# Patient Record
Sex: MALE | Race: OTHER | HISPANIC OR LATINO
[De-identification: names, ages, dates, MRNs, and addresses within clinical notes are randomized per-mention and may not be internally consistent; named-entity substitution may affect disease eponyms.]

---

## 2018-01-06 ENCOUNTER — APPOINTMENT (OUTPATIENT)
Dept: OBGYN | Facility: CLINIC | Age: 43
End: 2018-01-06
Payer: SELF-PAY

## 2018-01-06 DIAGNOSIS — Z20.828 CONTACT WITH AND (SUSPECTED) EXPOSURE TO OTHER VIRAL COMMUNICABLE DISEASES: ICD-10-CM

## 2018-01-06 PROCEDURE — 36415 COLL VENOUS BLD VENIPUNCTURE: CPT | Mod: NC

## 2018-01-07 PROBLEM — Z20.828 EXPOSURE TO ZIKA VIRUS: Status: ACTIVE | Noted: 2018-01-06

## 2018-01-09 LAB
ZIKA PCR SERUM: NOT DETECTED
ZIKA PCR URINE: NOT DETECTED

## 2020-07-17 ENCOUNTER — APPOINTMENT (OUTPATIENT)
Dept: ORTHOPEDIC SURGERY | Facility: CLINIC | Age: 45
End: 2020-07-17

## 2020-08-05 ENCOUNTER — APPOINTMENT (OUTPATIENT)
Dept: ORTHOPEDIC SURGERY | Facility: CLINIC | Age: 45
End: 2020-08-05
Payer: OTHER MISCELLANEOUS

## 2020-08-05 VITALS — WEIGHT: 185 LBS | HEIGHT: 66 IN | BODY MASS INDEX: 29.73 KG/M2

## 2020-08-05 VITALS — TEMPERATURE: 95.3 F

## 2020-08-05 DIAGNOSIS — S39.012A STRAIN OF MUSCLE, FASCIA AND TENDON OF LOWER BACK, INITIAL ENCOUNTER: ICD-10-CM

## 2020-08-05 DIAGNOSIS — M54.5 LOW BACK PAIN: ICD-10-CM

## 2020-08-05 PROCEDURE — 72100 X-RAY EXAM L-S SPINE 2/3 VWS: CPT

## 2020-08-05 PROCEDURE — 99203 OFFICE O/P NEW LOW 30 MIN: CPT

## 2020-08-05 RX ORDER — CYCLOBENZAPRINE HYDROCHLORIDE 10 MG/1
10 TABLET, FILM COATED ORAL 3 TIMES DAILY
Qty: 60 | Refills: 0 | Status: ACTIVE | COMMUNITY
Start: 2020-08-05 | End: 1900-01-01

## 2020-08-05 NOTE — PHYSICAL EXAM
[de-identified] : Examination of the lumbar spine reveals no midline tenderness palpation, step-offs, or skin lesions. Decreased range of motion with respect to flexion, extension, lateral bending, and rotation. No tenderness to palpation of the sciatic notch. No tenderness palpation of the bilateral greater trochanters. No pain with passive internal/external rotation of the hips. No instability of bilateral lower extremities.  Negative REAL. Negative straight leg raise bilaterally. No bowstring. Negative femoral stretch. 5 out of 5 iliopsoas, hip abductors, hips adductors, quadriceps, hamstrings, gastrocsoleus, tibialis anterior, extensor hallucis longus, peroneals. Grossly intact sensation to light touch bilateral lower extremities. 1+ patellar and Achilles reflexes. Downgoing Babinski. No clonus. Intact proprioception. Palpable pulses. No skin lesion and no edema on the right and left lower extremities. [de-identified] : AP lateral lumbar x-rays do not reveal any fracture or dislocation.  Mild degenerative changes.

## 2020-08-05 NOTE — DISCUSSION/SUMMARY
[de-identified] : He will try a course of physical therapy as well as over-the-counter NSAIDs and a muscle relaxant.  MRI if not improved.
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
## 2020-10-21 ENCOUNTER — APPOINTMENT (OUTPATIENT)
Dept: ORTHOPEDIC SURGERY | Facility: CLINIC | Age: 45
End: 2020-10-21
Payer: OTHER MISCELLANEOUS

## 2020-10-21 VITALS — TEMPERATURE: 95.5 F

## 2020-10-21 DIAGNOSIS — M47.817 SPONDYLOSIS W/OUT MYELOPATHY OR RADICULOPATHY, LUMBOSACRAL REGION: ICD-10-CM

## 2020-10-21 PROCEDURE — 99214 OFFICE O/P EST MOD 30 MIN: CPT

## 2020-10-21 NOTE — DISCUSSION/SUMMARY
[de-identified] : We discussed further treatment options.  He wishes to continue with physical therapy.  Follow-up afterwards.

## 2020-10-21 NOTE — PHYSICAL EXAM
[de-identified] : Examination of the lumbar spine reveals no midline tenderness palpation, step-offs, or skin lesions. Decreased range of motion with respect to flexion, extension, lateral bending, and rotation. No tenderness to palpation of the sciatic notch. No tenderness palpation of the bilateral greater trochanters. No pain with passive internal/external rotation of the hips. No instability of bilateral lower extremities.  Negative REAL. Negative straight leg raise bilaterally. No bowstring. Negative femoral stretch. 5 out of 5 iliopsoas, hip abductors, hips adductors, quadriceps, hamstrings, gastrocsoleus, tibialis anterior, extensor hallucis longus, peroneals. Grossly intact sensation to light touch bilateral lower extremities. 1+ patellar and Achilles reflexes. Downgoing Babinski. No clonus. Intact proprioception. Palpable pulses. No skin lesion and no edema on the right and left lower extremities. [de-identified] : AP lateral lumbar x-rays do not reveal any fracture or dislocation.  Mild degenerative changes.

## 2020-10-21 NOTE — HISTORY OF PRESENT ILLNESS
[de-identified] : Mr. JYOTHI OROZCO  is a 44 year old male who presents to the office for a follow-up visit.  He has been doing physical therapy and is 50% better.

## 2020-10-28 ENCOUNTER — APPOINTMENT (OUTPATIENT)
Dept: ORTHOPEDIC SURGERY | Facility: CLINIC | Age: 45
End: 2020-10-28
Payer: MEDICAID

## 2020-10-28 VITALS
HEART RATE: 69 BPM | HEIGHT: 78 IN | BODY MASS INDEX: 21.4 KG/M2 | TEMPERATURE: 97.9 F | SYSTOLIC BLOOD PRESSURE: 114 MMHG | WEIGHT: 185 LBS | DIASTOLIC BLOOD PRESSURE: 72 MMHG

## 2020-10-28 DIAGNOSIS — Z87.39 PERSONAL HISTORY OF OTHER DISEASES OF THE MUSCULOSKELETAL SYSTEM AND CONNECTIVE TISSUE: ICD-10-CM

## 2020-10-28 DIAGNOSIS — M79.602 PAIN IN LEFT ARM: ICD-10-CM

## 2020-10-28 DIAGNOSIS — M25.532 PAIN IN LEFT WRIST: ICD-10-CM

## 2020-10-28 DIAGNOSIS — M79.603 PAIN IN ARM, UNSPECIFIED: ICD-10-CM

## 2020-10-28 LAB
ALBUMIN SERPL ELPH-MCNC: 4.2 G/DL
ALP BLD-CCNC: 90 U/L
ALT SERPL-CCNC: 23 U/L
ANION GAP SERPL CALC-SCNC: 13 MMOL/L
APTT BLD: 32.8 SEC
AST SERPL-CCNC: 20 U/L
BASOPHILS # BLD AUTO: 0.04 K/UL
BASOPHILS NFR BLD AUTO: 0.6 %
BILIRUB SERPL-MCNC: 0.2 MG/DL
BUN SERPL-MCNC: 16 MG/DL
CALCIUM SERPL-MCNC: 9.5 MG/DL
CHLORIDE SERPL-SCNC: 100 MMOL/L
CO2 SERPL-SCNC: 25 MMOL/L
CREAT SERPL-MCNC: 0.87 MG/DL
EOSINOPHIL # BLD AUTO: 0.3 K/UL
EOSINOPHIL NFR BLD AUTO: 4.2 %
GLUCOSE SERPL-MCNC: 103 MG/DL
HCT VFR BLD CALC: 47.4 %
HGB BLD-MCNC: 15.1 G/DL
IMM GRANULOCYTES NFR BLD AUTO: 0.4 %
INR PPP: 0.92 RATIO
LYMPHOCYTES # BLD AUTO: 2.52 K/UL
LYMPHOCYTES NFR BLD AUTO: 35.2 %
MAN DIFF?: NORMAL
MCHC RBC-ENTMCNC: 27.7 PG
MCHC RBC-ENTMCNC: 31.9 GM/DL
MCV RBC AUTO: 87 FL
MONOCYTES # BLD AUTO: 0.61 K/UL
MONOCYTES NFR BLD AUTO: 8.5 %
NEUTROPHILS # BLD AUTO: 3.66 K/UL
NEUTROPHILS NFR BLD AUTO: 51.1 %
PLATELET # BLD AUTO: 401 K/UL
POTASSIUM SERPL-SCNC: 4.3 MMOL/L
PROT SERPL-MCNC: 7.4 G/DL
PT BLD: 11 SEC
RBC # BLD: 5.45 M/UL
RBC # FLD: 12.7 %
SODIUM SERPL-SCNC: 137 MMOL/L
WBC # FLD AUTO: 7.16 K/UL

## 2020-10-28 PROCEDURE — 99213 OFFICE O/P EST LOW 20 MIN: CPT

## 2020-10-28 PROCEDURE — 73110 X-RAY EXAM OF WRIST: CPT | Mod: LT

## 2020-10-28 PROCEDURE — 99072 ADDL SUPL MATRL&STAF TM PHE: CPT

## 2020-10-28 RX ORDER — IBUPROFEN 800 MG/1
TABLET, FILM COATED ORAL
Refills: 0 | Status: ACTIVE | COMMUNITY

## 2020-10-28 RX ORDER — CEPHALEXIN 500 MG/1
500 CAPSULE ORAL
Refills: 0 | Status: ACTIVE | COMMUNITY

## 2020-10-28 RX ORDER — TESTOSTERONE
POWDER (GRAM) MISCELLANEOUS
Refills: 0 | Status: ACTIVE | COMMUNITY

## 2020-10-28 NOTE — ADDENDUM
[FreeTextEntry1] : I, Adriana Aldana, acted solely as a scribe for Dr. Dodge on this date 10/28/2020.

## 2020-10-28 NOTE — END OF VISIT
[FreeTextEntry3] : This note was written by Adriana Aldana on 10/28/2020 acting solely as a scribe for Dr. Patrick Dodge.\par  \par All medical record entries made by the Scribe were at my, Dr. Patrick Dodge, direction and personally dictated by me on 10/28/2020. I have personally reviewed the chart and agree that the record accurately reflects my personal performance of the history, physical exam, assessment and plan.

## 2020-10-28 NOTE — DISCUSSION/SUMMARY
[FreeTextEntry1] : He has findings consistent with a displaced and comminuted intra-articular left distal radius fracture after a fall 6 days ago.\par \par I had a discussion regarding today's visit, the diagnosis, and treatment recommendations / options.  At this time, I have recommended ORIF.  He has agreed to proceed. I also recommended extra-strength Tylenol every 6 hours for pain relief in the interim. \par \par -  The nature and purposes of the operation/procedure was discussed in detail.  I discussed the surgical procedure in detail, as well as the expected postoperative recovery and outcome.\par -  Possible risks, benefits, and complications (from known and unknown causes) of the procedure were discussed in detail.  \par -  Possible non-operative alternatives to the proposed treatment were discussed in detail.  \par -  He was told that possible risks/complications include, but are not limited to:  Infection, nerve or vessel injury, stiffness, painful scar, poor outcome, need for additional surgical procedures, and other unforeseen complications. I also discussed additional potential risks inherent in ORIF of a distal radius fracture with a volar plate. The patient understands that there is a risk that the plate may need to be removed in the future, if it results in pain or other hardware-related problems. I also discussed the potential of tendon related problems secondary to volar plates, including, but not limited to, tendinitis, tendinopathy, and even tendon rupture at a late date.  \par -  Finally, the possibility of an ""unsuccessful outcome,"" despite ""successful surgery,"" was discussed with the patient.  \par -  The patient fully understands these risks and wishes to proceed.  \par -  I had a lengthy discussion with the patient regarding today's visit, the diagnosis, and my surgical treatment recommendations.  The patient has agreed to this plan of management and has expressed full understanding.  All questions were fully answered to the patient's satisfaction. \par \par Over 50% of the time spent with the patient was on counseling the patient on the above diagnosis, treatment plan and prognosis.
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
## 2020-10-30 ENCOUNTER — OUTPATIENT (OUTPATIENT)
Dept: OUTPATIENT SERVICES | Facility: HOSPITAL | Age: 45
LOS: 1 days | End: 2020-10-30
Payer: MEDICAID

## 2020-10-30 VITALS
RESPIRATION RATE: 9 BRPM | OXYGEN SATURATION: 97 % | HEIGHT: 66 IN | WEIGHT: 193.12 LBS | SYSTOLIC BLOOD PRESSURE: 134 MMHG | TEMPERATURE: 98 F | DIASTOLIC BLOOD PRESSURE: 94 MMHG | HEART RATE: 81 BPM

## 2020-10-30 DIAGNOSIS — Z98.890 OTHER SPECIFIED POSTPROCEDURAL STATES: Chronic | ICD-10-CM

## 2020-10-30 DIAGNOSIS — S52.502A UNSPECIFIED FRACTURE OF THE LOWER END OF LEFT RADIUS, INITIAL ENCOUNTER FOR CLOSED FRACTURE: ICD-10-CM

## 2020-10-30 DIAGNOSIS — Z01.818 ENCOUNTER FOR OTHER PREPROCEDURAL EXAMINATION: ICD-10-CM

## 2020-10-30 DIAGNOSIS — Z90.49 ACQUIRED ABSENCE OF OTHER SPECIFIED PARTS OF DIGESTIVE TRACT: Chronic | ICD-10-CM

## 2020-10-30 NOTE — H&P PST ADULT - ASSESSMENT
44  is scheduled for ORIF of intra articular left distal radius fracture with greater than 3 fragments on 11/3/2020 with Dr Dodge at [facility].

## 2020-10-30 NOTE — H&P PST ADULT - NSICDXPASTSURGICALHX_GEN_ALL_CORE_FT
PAST SURGICAL HISTORY:  History of appendectomy 1993    History of bilateral inguinal hernia repair 2000

## 2020-10-30 NOTE — H&P PST ADULT - NSICDXPROBLEM_GEN_ALL_CORE_FT
PROBLEM DIAGNOSES  Problem: Distal radius fracture, left  Assessment and Plan: ORIF left distal radius fracture is scheduled for 11/3/2020 with physical exam at bedside.  pre op instructions were reviewed; COVID19 PCR testing is schedueld for 11/1/2020; isolation reviewed.
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
## 2020-10-30 NOTE — H&P PST ADULT - MUSCULOSKELETAL
details… detailed exam left hand, arm-fingers warm and mobile with sensation present/no calf tenderness/decreased ROM/decreased ROM due to pain

## 2020-10-30 NOTE — H&P PST ADULT - NSANTHOSAYNRD_GEN_A_CORE
No. EYAL screening performed.  STOP BANG Legend: 0-2 = LOW Risk; 3-4 = INTERMEDIATE Risk; 5-8 = HIGH Risk

## 2020-11-01 ENCOUNTER — OUTPATIENT (OUTPATIENT)
Dept: OUTPATIENT SERVICES | Facility: HOSPITAL | Age: 45
LOS: 1 days | End: 2020-11-01
Payer: MEDICAID

## 2020-11-01 DIAGNOSIS — Z90.49 ACQUIRED ABSENCE OF OTHER SPECIFIED PARTS OF DIGESTIVE TRACT: Chronic | ICD-10-CM

## 2020-11-01 DIAGNOSIS — Z98.890 OTHER SPECIFIED POSTPROCEDURAL STATES: Chronic | ICD-10-CM

## 2020-11-01 DIAGNOSIS — Z11.59 ENCOUNTER FOR SCREENING FOR OTHER VIRAL DISEASES: ICD-10-CM

## 2020-11-01 LAB — SARS-COV-2 RNA SPEC QL NAA+PROBE: SIGNIFICANT CHANGE UP

## 2020-11-01 PROCEDURE — U0003: CPT

## 2020-11-02 ENCOUNTER — TRANSCRIPTION ENCOUNTER (OUTPATIENT)
Age: 45
End: 2020-11-02

## 2020-11-02 DIAGNOSIS — Z11.59 ENCOUNTER FOR SCREENING FOR OTHER VIRAL DISEASES: ICD-10-CM

## 2020-11-02 PROCEDURE — G0463: CPT

## 2020-11-02 NOTE — ASU PATIENT PROFILE, ADULT - MEDICATION HERBAL REMEDIES, PROFILE
Orders Placed This Encounter   • SERVICE TO [facility]     Requested that they bring them next week.     Patient still needs to keep follow-up appointment with Dr. Valenzuela   no

## 2020-11-03 ENCOUNTER — APPOINTMENT (OUTPATIENT)
Dept: ORTHOPEDIC SURGERY | Facility: HOSPITAL | Age: 45
End: 2020-11-03

## 2020-11-03 ENCOUNTER — OUTPATIENT (OUTPATIENT)
Dept: OUTPATIENT SERVICES | Facility: HOSPITAL | Age: 45
LOS: 1 days | End: 2020-11-03
Payer: MEDICAID

## 2020-11-03 ENCOUNTER — RESULT REVIEW (OUTPATIENT)
Age: 45
End: 2020-11-03

## 2020-11-03 VITALS
SYSTOLIC BLOOD PRESSURE: 117 MMHG | HEART RATE: 85 BPM | OXYGEN SATURATION: 94 % | RESPIRATION RATE: 13 BRPM | DIASTOLIC BLOOD PRESSURE: 73 MMHG | TEMPERATURE: 98 F

## 2020-11-03 VITALS
DIASTOLIC BLOOD PRESSURE: 94 MMHG | WEIGHT: 193.12 LBS | HEIGHT: 65 IN | TEMPERATURE: 98 F | SYSTOLIC BLOOD PRESSURE: 134 MMHG | RESPIRATION RATE: 12 BRPM | OXYGEN SATURATION: 97 % | HEART RATE: 81 BPM

## 2020-11-03 DIAGNOSIS — Z98.890 OTHER SPECIFIED POSTPROCEDURAL STATES: Chronic | ICD-10-CM

## 2020-11-03 DIAGNOSIS — Z90.49 ACQUIRED ABSENCE OF OTHER SPECIFIED PARTS OF DIGESTIVE TRACT: Chronic | ICD-10-CM

## 2020-11-03 DIAGNOSIS — S52.502A UNSPECIFIED FRACTURE OF THE LOWER END OF LEFT RADIUS, INITIAL ENCOUNTER FOR CLOSED FRACTURE: ICD-10-CM

## 2020-11-03 PROCEDURE — C1713: CPT

## 2020-11-03 PROCEDURE — 25609 OPTX DST RD XART FX/EP SEP3+: CPT | Mod: LT

## 2020-11-03 PROCEDURE — 76000 FLUOROSCOPY <1 HR PHYS/QHP: CPT

## 2020-11-03 RX ORDER — ACETAMINOPHEN 500 MG
2 TABLET ORAL
Qty: 0 | Refills: 0 | DISCHARGE

## 2020-11-03 RX ORDER — SODIUM CHLORIDE 9 MG/ML
1000 INJECTION, SOLUTION INTRAVENOUS
Refills: 0 | Status: DISCONTINUED | OUTPATIENT
Start: 2020-11-03 | End: 2020-11-03

## 2020-11-03 RX ORDER — APREPITANT 80 MG/1
40 CAPSULE ORAL ONCE
Refills: 0 | Status: COMPLETED | OUTPATIENT
Start: 2020-11-03 | End: 2020-11-03

## 2020-11-03 RX ORDER — ONDANSETRON 8 MG/1
4 TABLET, FILM COATED ORAL ONCE
Refills: 0 | Status: DISCONTINUED | OUTPATIENT
Start: 2020-11-03 | End: 2020-11-03

## 2020-11-03 RX ORDER — OXYCODONE HYDROCHLORIDE 5 MG/1
5 TABLET ORAL ONCE
Refills: 0 | Status: DISCONTINUED | OUTPATIENT
Start: 2020-11-03 | End: 2020-11-03

## 2020-11-03 RX ORDER — CEFAZOLIN SODIUM 1 G
2000 VIAL (EA) INJECTION ONCE
Refills: 0 | Status: COMPLETED | OUTPATIENT
Start: 2020-11-03 | End: 2020-11-03

## 2020-11-03 RX ORDER — CHLORHEXIDINE GLUCONATE 213 G/1000ML
1 SOLUTION TOPICAL ONCE
Refills: 0 | Status: DISCONTINUED | OUTPATIENT
Start: 2020-11-03 | End: 2020-11-03

## 2020-11-03 RX ORDER — HYDROMORPHONE HYDROCHLORIDE 2 MG/ML
0.5 INJECTION INTRAMUSCULAR; INTRAVENOUS; SUBCUTANEOUS
Refills: 0 | Status: DISCONTINUED | OUTPATIENT
Start: 2020-11-03 | End: 2020-11-03

## 2020-11-03 RX ORDER — CEPHALEXIN 500 MG
0 CAPSULE ORAL
Qty: 0 | Refills: 0 | DISCHARGE

## 2020-11-03 RX ADMIN — APREPITANT 40 MILLIGRAM(S): 80 CAPSULE ORAL at 10:02

## 2020-11-03 NOTE — BRIEF OPERATIVE NOTE - NSICDXBRIEFPROCEDURE_GEN_ALL_CORE_FT
PROCEDURES:  Open reduction and internal fixation (ORIF) of 3 or more fragments of distal radius 03-Nov-2020 13:04:05 left radius Shyann De La Rosa

## 2020-11-03 NOTE — ASU DISCHARGE PLAN (ADULT/PEDIATRIC) - PAIN MANAGEMENT
Prescriptions electronically submitted to pharmacy from [facility] electronically submitted to pharmacy from [facility]/perocet 5/325

## 2020-11-03 NOTE — ASU DISCHARGE PLAN (ADULT/PEDIATRIC) - CARE PROVIDER_API CALL
Patrick Dodge  ORTHOPAEDIC SURGERY  [address], [address]  [place] [address]  Phone: [phone]  Fax: [phone]  Follow Up Time:

## 2020-11-03 NOTE — ASU DISCHARGE PLAN (ADULT/PEDIATRIC) - COMMENTS
call Dr Dodge's office for follow up on      at call Dr Dodge's office for follow up on 11/11/20 at [facility]

## 2020-11-03 NOTE — ASU DISCHARGE PLAN (ADULT/PEDIATRIC) - ACTIVITY LEVEL
No heavy lifting/wear sling as needed for comfort. Ice 20 minutes on alternating with 20 minutes off for 48 hours post op

## 2020-11-03 NOTE — ASU DISCHARGE PLAN (ADULT/PEDIATRIC) - CALL YOUR DOCTOR IF YOU HAVE ANY OF THE FOLLOWING:
Numbness, tingling, color or temperature change to extremity/Wound/Surgical Site with redness, or foul smelling discharge or pus/Swelling that gets worse/Fever greater than (need to indicate Fahrenheit or Celsius)/Pain not relieved by Medications/Bleeding that does not stop

## 2020-11-04 ENCOUNTER — NON-APPOINTMENT (OUTPATIENT)
Age: 45
End: 2020-11-04

## 2020-11-05 PROBLEM — E34.9 ENDOCRINE DISORDER, UNSPECIFIED: Chronic | Status: ACTIVE | Noted: 2020-10-30

## 2020-11-05 PROBLEM — S52.502A UNSPECIFIED FRACTURE OF THE LOWER END OF LEFT RADIUS, INITIAL ENCOUNTER FOR CLOSED FRACTURE: Chronic | Status: ACTIVE | Noted: 2020-10-30

## 2020-11-11 ENCOUNTER — APPOINTMENT (OUTPATIENT)
Dept: ORTHOPEDIC SURGERY | Facility: CLINIC | Age: 45
End: 2020-11-11
Payer: MEDICAID

## 2020-11-11 VITALS — TEMPERATURE: 98 F

## 2020-11-11 PROCEDURE — 73110 X-RAY EXAM OF WRIST: CPT | Mod: LT

## 2020-11-11 PROCEDURE — 29075 APPL CST ELBW FNGR SHORT ARM: CPT | Mod: 58,LT

## 2020-11-11 PROCEDURE — 99024 POSTOP FOLLOW-UP VISIT: CPT

## 2020-11-11 NOTE — HISTORY OF PRESENT ILLNESS
[de-identified] : 8 days postoperative. [de-identified] : 8 days status post ORIF of left distal radius fracture.\par \par He is doing well and his pain is improving.\par \par He was accompanied by his wife today. [de-identified] : Examination of his left wrist and hand after the splint and dressing were removed demonstrates his incision to be clean and dry.  There is decreased swelling.  There is no drainage or evidence of infection.  He has appropriate flexion and extension of the digits.  He is neurovascularly intact distally. [de-identified] : PA, lateral, oblique and 20 degree tilt lateral views of his left wrist demonstrate his distal radius fracture and hardware to be well aligned.  Again, the fracture was highly comminuted with multiple intra-articular fragments. [de-identified] : Stable, 8 days postoperative. [de-identified] : The suture ends were cut and Steri-Strips were applied.  He was placed into a well-padded well molded left short arm fiberglass cast.  He was instructed on cast care, elevation and range of motion exercises to the digits.  He will follow-up in 2 weeks for x-rays out of plaster.

## 2020-11-25 ENCOUNTER — APPOINTMENT (OUTPATIENT)
Dept: ORTHOPEDIC SURGERY | Facility: CLINIC | Age: 45
End: 2020-11-25
Payer: MEDICAID

## 2020-11-25 VITALS — TEMPERATURE: 97.5 F

## 2020-11-25 PROCEDURE — 73110 X-RAY EXAM OF WRIST: CPT | Mod: LT

## 2020-11-25 PROCEDURE — 29075 APPL CST ELBW FNGR SHORT ARM: CPT | Mod: 58,LT

## 2020-11-25 PROCEDURE — 99024 POSTOP FOLLOW-UP VISIT: CPT

## 2020-11-25 NOTE — END OF VISIT
[FreeTextEntry3] : This note was written by Adriana Aldana on 11/25/2020 acting solely as a scribe for Dr. Patrick Dodge.\par  \par All medical record entries made by the Scribe were at my, Dr. Patrick Dodge, direction and personally dictated by me on 11/25/2020. I have personally reviewed the chart and agree that the record accurately reflects my personal performance of the history, physical exam, assessment and plan.

## 2020-11-25 NOTE — ADDENDUM
[FreeTextEntry1] : I, Adriana Aldana, acted solely as a scribe for Dr. Dodge on this date 11/25/2020.

## 2020-11-25 NOTE — HISTORY OF PRESENT ILLNESS
[de-identified] : 22 days postoperative. [de-identified] : 22 days status post ORIF of left distal radius fracture.\par \par He is doing well and his pain is improving. \par \par He also has complaints of increasing right shoulder pain. He states he may be using the arm more to compensate. \par \par He was accompanied by his wife today. [de-identified] : Examination of his left wrist after the cast was removed demonstrates decreased swelling.  His incision is healing well.  His wound along the distal ulna is healing but is tender.  There is no evidence of an infection.  He has improved flexion and extension of the digits.  He is neurovascularly intact distally. [de-identified] : PA, lateral, oblique and 20 degree tilt lateral views of his left wrist demonstrate his distal radius fracture and hardware to be in acceptable alignment.  There is mild settling of the fracture.  Again, the fracture was highly comminuted with multiple intra-articular fragments, with significant dorsal comminution.. [de-identified] : Stable, 22 days postoperative. [de-identified] : At this time, I recommend 2 more weeks of immobilization in the cast, given the significant comminution.  He was placed into a new well-padded well molded left short arm fiberglass cast for the next 2 weeks.  He was instructed on gentle range of motion exercises and scar massage and desensitization.  He will follow-up in 2 weeks for x-rays out of plaster.\par \par With regard to his right shoulder, I recommended he see Dr. Storey next week for evaluation.

## 2020-11-30 ENCOUNTER — APPOINTMENT (OUTPATIENT)
Dept: ORTHOPEDIC SURGERY | Facility: CLINIC | Age: 45
End: 2020-11-30

## 2020-12-02 ENCOUNTER — APPOINTMENT (OUTPATIENT)
Dept: ORTHOPEDIC SURGERY | Facility: CLINIC | Age: 45
End: 2020-12-02
Payer: MEDICAID

## 2020-12-02 VITALS
BODY MASS INDEX: 29.73 KG/M2 | TEMPERATURE: 97.7 F | DIASTOLIC BLOOD PRESSURE: 84 MMHG | HEIGHT: 66 IN | WEIGHT: 185 LBS | HEART RATE: 88 BPM | SYSTOLIC BLOOD PRESSURE: 119 MMHG

## 2020-12-02 PROCEDURE — 73030 X-RAY EXAM OF SHOULDER: CPT | Mod: RT

## 2020-12-02 PROCEDURE — 99215 OFFICE O/P EST HI 40 MIN: CPT | Mod: 25

## 2020-12-02 PROCEDURE — 99072 ADDL SUPL MATRL&STAF TM PHE: CPT

## 2020-12-02 PROCEDURE — 20610 DRAIN/INJ JOINT/BURSA W/O US: CPT | Mod: RT

## 2020-12-03 RX ORDER — METHYLPRED ACET/NACL,ISO-OS/PF 40 MG/ML
40 VIAL (ML) INJECTION
Qty: 1 | Refills: 0 | Status: COMPLETED | OUTPATIENT
Start: 2020-12-03

## 2020-12-03 RX ORDER — LIDOCAINE HYDROCHLORIDE 10 MG/ML
1 INJECTION, SOLUTION INFILTRATION; PERINEURAL
Refills: 0 | Status: COMPLETED | OUTPATIENT
Start: 2020-12-03

## 2020-12-03 RX ADMIN — Medication %: at 00:00

## 2020-12-03 RX ADMIN — METHYLPREDNISOLONE ACETATE MG/ML: 40 INJECTION, SUSPENSION INTRA-ARTICULAR; INTRALESIONAL; INTRAMUSCULAR; SOFT TISSUE at 00:00

## 2020-12-09 ENCOUNTER — APPOINTMENT (OUTPATIENT)
Dept: ORTHOPEDIC SURGERY | Facility: CLINIC | Age: 45
End: 2020-12-09
Payer: MEDICAID

## 2020-12-09 VITALS — TEMPERATURE: 96.5 F

## 2020-12-09 PROCEDURE — 99024 POSTOP FOLLOW-UP VISIT: CPT

## 2020-12-09 PROCEDURE — 73110 X-RAY EXAM OF WRIST: CPT | Mod: LT

## 2020-12-09 NOTE — ADDENDUM
[FreeTextEntry1] : I, Adriana Aldana, acted solely as a scribe for Dr. Dodge on this date 12/09/2020.

## 2020-12-09 NOTE — HISTORY OF PRESENT ILLNESS
[de-identified] : 36 days postoperative. [de-identified] : 36 days status post ORIF of left distal radius fracture.\par \par He is doing well. He reports some mild pain in the wrist with movement.  [de-identified] : Examination of his left wrist after the cast was removed demonstrates decreased swelling.  His incision is healing well.  His wound along the distal ulna is healing but is tender.  There is no evidence of an infection.  He has near full flexion and extension of the digits.  He is neurovascularly intact distally. [de-identified] : PA, lateral, oblique and 20 degree tilt lateral views of his left wrist demonstrate his distal radius fracture and hardware to be in acceptable alignment.  There is mild settling of the fracture.  There is mild shortening and significant dorsal comminution with less than 5 degrees of dorsal angulation on the lateral.  Again, the fracture was highly comminuted with multiple intra-articular fragments, with significant dorsal comminution.. [de-identified] : Stable, 36 days postoperative. [de-identified] : At this time, he can remain out of the cast.  He was fitted with a splint and instructed on range of motion exercises.  He was referred to occupational therapy.  I did discuss the radiographic findings with him and he understands that he should avoid any heavy lifting twisting or strenuous activities.  He will follow-up in 2 weeks.

## 2020-12-09 NOTE — END OF VISIT
[FreeTextEntry3] : This note was written by Adriana Aldana on 12/09/2020 acting solely as a scribe for Dr. Patrick Dodge.\par  \par All medical record entries made by the Scribe were at my, Dr. Patrick Dodge, direction and personally dictated by me on 12/09/2020. I have personally reviewed the chart and agree that the record accurately reflects my personal performance of the history, physical exam, assessment and plan.

## 2020-12-23 ENCOUNTER — APPOINTMENT (OUTPATIENT)
Dept: ORTHOPEDIC SURGERY | Facility: CLINIC | Age: 45
End: 2020-12-23
Payer: MEDICAID

## 2021-01-06 ENCOUNTER — APPOINTMENT (OUTPATIENT)
Dept: ORTHOPEDIC SURGERY | Facility: CLINIC | Age: 46
End: 2021-01-06
Payer: MEDICAID

## 2021-01-06 VITALS — WEIGHT: 185 LBS | BODY MASS INDEX: 29.73 KG/M2 | HEIGHT: 66 IN

## 2021-01-06 VITALS — TEMPERATURE: 97.2 F

## 2021-01-06 DIAGNOSIS — Z78.9 OTHER SPECIFIED HEALTH STATUS: ICD-10-CM

## 2021-01-06 PROCEDURE — 73110 X-RAY EXAM OF WRIST: CPT | Mod: LT

## 2021-01-06 PROCEDURE — 99024 POSTOP FOLLOW-UP VISIT: CPT

## 2021-01-06 NOTE — HISTORY OF PRESENT ILLNESS
[de-identified] : 9 weeks and 1 day postoperative. [de-identified] : 9 weeks and 1 day status post ORIF of left distal radius fracture.\par \par He has not yet started occupational therapy.\par \par He is doing well and is significantly improved. His pain has resolved. He reports occasional clicking in the wrist with certain movements. He denies any associated pain. He has also developed some dry, flakey skin on the palm of his left hand for the past 1 week. \par \par He works as a radiology technician. [de-identified] : Examination of his left wrist demonstrates decreased swelling.  His incision is healing well.  His wound along the distal ulna is healed.  There is dryness of the skin and peeling, which may either be secondary to dry skin or possibly a dermatitis.  He has full flexion and extension of the digits.  He has approximately 40 degrees of wrist flexion and 75 degrees of pronation and supination.  With pronation supination, he has mild clicking in the region of the distal ulna and he has obvious laxity of the distal ulna.  He remains neurovascularly intact distally. [de-identified] : PA, lateral, oblique and 20 degree tilt lateral views of his left wrist demonstrate his distal radius fracture and hardware to be further healed.  Again, there was some settling of the fracture.  There is mild shortening and positive ulnar variance.  There is significant dorsal comminution with less than 5 degrees of dorsal angulation on the lateral.  The dorsal fragment has not completely healed.  There was an associated ulnar styloid avulsion fracture which did not completely heal and there is widening of the DRUJ. [de-identified] : Clinically improved, 9 weeks and 1 day postoperative.  He has evidence of instability of his DRUJ. [de-identified] : At this time I recommended he begin a course of occupational therapy.  He will follow-up in 1 month.\par \par I have recommended moisturizing cream for the dry skin within the hand.  However, if this does not improve, I did tell him that this may represent a dermatitis.  If it does not improve, then I recommended evaluation by a dermatologist.\par \par Finally, I did have a discussion with him regarding the clicking and the instability of his DRUJ.  I did discuss with him that this may require treatment in the future, possibly stabilization of the DRUJ.

## 2021-01-06 NOTE — ADDENDUM
[FreeTextEntry1] : I, Adriana Aldana, acted solely as a scribe for Dr. Dodge on this date 01/06/2021.

## 2021-01-06 NOTE — END OF VISIT
[FreeTextEntry3] : This note was written by Adriana Aldana on 01/06/2021 acting solely as a scribe for Dr. Patrick Dodge.\par  \par All medical record entries made by the Scribe were at my, Dr. Patrick Dodge, direction and personally dictated by me on 01/06/2021. I have personally reviewed the chart and agree that the record accurately reflects my personal performance of the history, physical exam, assessment and plan.

## 2021-01-13 ENCOUNTER — APPOINTMENT (OUTPATIENT)
Dept: ORTHOPEDIC SURGERY | Facility: CLINIC | Age: 46
End: 2021-01-13
Payer: MEDICAID

## 2021-01-13 VITALS — TEMPERATURE: 97.2 F | HEIGHT: 66 IN | BODY MASS INDEX: 29.73 KG/M2 | WEIGHT: 185 LBS

## 2021-01-13 PROCEDURE — 99214 OFFICE O/P EST MOD 30 MIN: CPT

## 2021-01-13 PROCEDURE — 99072 ADDL SUPL MATRL&STAF TM PHE: CPT

## 2021-01-13 PROCEDURE — 73030 X-RAY EXAM OF SHOULDER: CPT | Mod: RT

## 2021-01-15 DIAGNOSIS — Z01.812 ENCOUNTER FOR PREPROCEDURAL LABORATORY EXAMINATION: ICD-10-CM

## 2021-01-15 DIAGNOSIS — M25.50 PAIN IN UNSPECIFIED JOINT: ICD-10-CM

## 2021-01-15 DIAGNOSIS — Z01.818 ENCOUNTER FOR OTHER PREPROCEDURAL EXAMINATION: ICD-10-CM

## 2021-02-23 ENCOUNTER — APPOINTMENT (OUTPATIENT)
Dept: ORTHOPEDIC SURGERY | Facility: CLINIC | Age: 46
End: 2021-02-23
Payer: MEDICAID

## 2021-02-23 VITALS — HEIGHT: 66 IN | WEIGHT: 185 LBS | BODY MASS INDEX: 29.73 KG/M2 | TEMPERATURE: 96.6 F

## 2021-02-23 DIAGNOSIS — S43.431A SUPERIOR GLENOID LABRUM LESION OF RIGHT SHOULDER, INITIAL ENCOUNTER: ICD-10-CM

## 2021-02-23 DIAGNOSIS — M75.51 BURSITIS OF RIGHT SHOULDER: ICD-10-CM

## 2021-02-23 PROCEDURE — 99213 OFFICE O/P EST LOW 20 MIN: CPT

## 2021-02-23 PROCEDURE — 99072 ADDL SUPL MATRL&STAF TM PHE: CPT

## 2021-03-17 ENCOUNTER — APPOINTMENT (OUTPATIENT)
Dept: ORTHOPEDIC SURGERY | Facility: CLINIC | Age: 46
End: 2021-03-17
Payer: MEDICAID

## 2021-03-17 VITALS — TEMPERATURE: 97.1 F

## 2021-03-17 PROCEDURE — 73110 X-RAY EXAM OF WRIST: CPT | Mod: LT

## 2021-03-17 PROCEDURE — 99072 ADDL SUPL MATRL&STAF TM PHE: CPT

## 2021-03-17 PROCEDURE — 99214 OFFICE O/P EST MOD 30 MIN: CPT

## 2021-04-02 PROBLEM — S52.502A DISTAL RADIUS FRACTURE, LEFT: Status: ACTIVE | Noted: 2020-10-28

## 2021-04-09 ENCOUNTER — APPOINTMENT (OUTPATIENT)
Dept: ORTHOPEDIC SURGERY | Facility: CLINIC | Age: 46
End: 2021-04-09
Payer: MEDICAID

## 2021-04-09 VITALS — TEMPERATURE: 97.4 F

## 2021-04-09 DIAGNOSIS — S52.502A UNSPECIFIED FRACTURE OF THE LOWER END OF LEFT RADIUS, INITIAL ENCOUNTER FOR CLOSED FRACTURE: ICD-10-CM

## 2021-04-09 PROCEDURE — 99072 ADDL SUPL MATRL&STAF TM PHE: CPT

## 2021-04-09 PROCEDURE — 99214 OFFICE O/P EST MOD 30 MIN: CPT

## 2021-04-09 NOTE — ADDENDUM
[FreeTextEntry1] : I, Adriana Aldana, acted solely as a scribe for Dr. Dodge on this date 04/09/2021.

## 2021-04-09 NOTE — END OF VISIT
[FreeTextEntry3] : This note was written by Adriana Aldana on 03/17/2021 acting solely as a scribe for Dr. Patrick Dodge.\par  \par All medical record entries made by the Scribe were at my, Dr. Patrick Dodge, direction and personally dictated by me on 03/17/2021. I have personally reviewed the chart and agree that the record accurately reflects my personal performance of the history, physical exam, assessment and plan.

## 2021-04-09 NOTE — DISCUSSION/SUMMARY
[FreeTextEntry1] : I reviewed the MRI results with him.  I had a discussion regarding today's visit, the diagnosis and treatment recommendations / options.  We discussed the options of observation versus surgery.  In terms of surgery, after reviewing the MRI, I would recommend he consider an ulnar shortening osteotomy.  I believe that this would correct his ulnar positive variance and hopefully tighten up his ligamentous laxity.  He told me that he would like to avoid surgery but will consider this.  I told him that this will likely prevent further problems in the future, as he is only 45 years old.  I also discussed with him that if this does not ultimately cure the condition and laxity persist, then he may even require an Clarence's procedure.  However, he does have ligamentous laxity and clicking on the right side and he does not have symptoms.  He will think about his options.  He was instructed on activity modification and avoidance of pushups. He will follow-up in 6 weeks.  He was instructed to return before then if the would like to proceed with with surgery.\par \par The patient has agreed to the above plan of management and has expressed full understanding.  All questions were fully answered to the patient's satisfaction.\par \par I spent at least 30 minutes in total on this patient's visit.  This included: Preparation for the visit, review of the medical records, review of pertinent diagnostic studies, examination and counseling of the patient on the above diagnosis, treatment plan and prognosis, orders of diagnostic tests, medications and/or appropriate procedures and documentation in the medical records of today's visit.

## 2021-04-09 NOTE — END OF VISIT
[FreeTextEntry3] : This note was written by Adriana Aldana on 04/09/2021 acting solely as a scribe for Dr. Patrick Dodge.\par  \par All medical record entries made by the Scribe were at my, Dr. Patrick Dodge, direction and personally dictated by me on 04/09/2021. I have personally reviewed the chart and agree that the record accurately reflects my personal performance of the history, physical exam, assessment and plan.

## 2021-04-09 NOTE — DISCUSSION/SUMMARY
[FreeTextEntry1] : I had a discussion regarding today's visit, the diagnosis and treatment recommendations / options.  He has evidence of instability of the DRUJ as well interval shortening of the fracture.  In addition, he does have underlying ligamentous laxity.  I discussed further treatment recommendations.  I did tell him that it is possible that he may need further surgery either an ulnar shortening osteotomy and/or reconstruction of the DRUJ ligaments.  I have ordered an MRI to better evaluate the DRUJ ligaments, knowing that this may not be helpful because of artifact from the distal radius plate. He will follow up after his MRI to review the results and discuss treatment recommendations. \par \par The patient has agreed to the above plan of management and has expressed full understanding.  All questions were fully answered to the patient's satisfaction.\par \par I spent at least 30 minutes in total on this patient's visit.  This included: Preparation for the visit, review of the medical records, review of pertinent diagnostic studies, examination and counseling of the patient on the above diagnosis, treatment plan and prognosis, orders of diagnostic tests, medications and/or appropriate procedures and documentation in the medical records of today's visit.

## 2021-04-09 NOTE — HISTORY OF PRESENT ILLNESS
[Right] : right hand dominant [FreeTextEntry1] : 4-1/2-month status post ORIF of left distal radius fracture.  Date of surgery: 11/3/2020.  He was last seen in the office on January 6, 2021.  He was referred to occupational therapy.\par \par He returns today in follow-up.  He is still having residual pain and soreness along the ulnar aspect of the wrist that is intermittent in nature. 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 He also reports occasional clicking of the wrist. He rates his pain a 5 out of 10 at this time.

## 2021-04-09 NOTE — HISTORY OF PRESENT ILLNESS
[Right] : right hand dominant [FreeTextEntry1] : Greater than 4 months status post ORIF of left distal radius fracture.  Date of surgery: 11/3/2020.  See note from when he was seen in the office 23 days ago.  I ordered an MRI.  He comes in to review the results and discuss treatment recommendations.\par \par He returns with persistent symptoms. He describes pain and soreness along the ulnar aspect of his left wrist with use of the hand. He has been unable to do pushups secondary to pain.  Overall he is improved but continues to have clicking and ulnar-sided pain.

## 2021-04-09 NOTE — PHYSICAL EXAM
[de-identified] : - Constitutional: This is a male in no obvious distress.  \par - Psych: Patient is alert and oriented to person, place and time.  Patient has a normal mood and affect.\par - Cardiovascular: Normal pulses throughout the upper extremities.  No significant varicosities are noted in the upper extremities. \par - Neuro: Strength and sensation are intact throughout the upper extremities.  Patient has normal coordination.\par - Respiratory:  Patient exhibits no evidence of shortness of breath or difficulty breathing.\par - Skin: No rashes, lesions, or other abnormalities are noted in the upper extremities.\par \par ---\par \par Examination of his left wrist demonstrates his incision to be well-healed.  He has prominence of the distal ulna.  He has approximately 60 degrees of wrist flexion and 45 degrees of extension.  He has 75 degrees of pronation supination.  There is laxity to stress testing of the DRUJ.  With pronation supination, there appears to be subluxation of the ulna on the radius which is intermittent.  He has full flexion and extension of the digits.  He remains neurovascularly intact distally. [de-identified] : PA, lateral, oblique, PA , and 20 degree tilt lateral views of his left wrist demonstrate his distal radius fracture to be healed.  Again, there has been some interval shortening of the fracture with positive ulnar variance.  There is approximately 5 degrees of dorsal angulation on the lateral.  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

## 2021-04-09 NOTE — ADDENDUM
[FreeTextEntry1] : I, Adriana Aldana, acted solely as a scribe for Dr. Dodge on this date 03/17/2021.

## 2021-04-09 NOTE — PHYSICAL EXAM
[de-identified] : - Constitutional: This is a male in no obvious distress.  \par - Psych: Patient is alert and oriented to person, place and time.  Patient has a normal mood and affect.\par - Cardiovascular: Normal pulses throughout the upper extremities.  No significant varicosities are noted in the upper extremities. \par - Neuro: Strength and sensation are intact throughout the upper extremities.  Patient has normal coordination.\par - Respiratory:  Patient exhibits no evidence of shortness of breath or difficulty breathing.\par - Skin: No rashes, lesions, or other abnormalities are noted in the upper extremities.\par \par ---\par \par Examination of his left wrist demonstrates his incision to be well-healed.  He has prominence of the distal ulna.  He has approximately 60 degrees of wrist flexion and 45 degrees of extension.  He has 80 degrees of pronation 60 degrees of supination.  There is laxity to stress testing of the DRUJ.  With pronation supination, there appears to be subluxation of the ulna on the radius which is intermittent.  He has full flexion and extension of the digits.  He remains neurovascularly intact distally. [de-identified] : I reviewed the MRI of his right wrist.  This demonstrated essential healing of his fracture.  The articular surface is well aligned.  There is approximately neutral tilt on the lateral.  There is positive ulnar variance.  There is a moderate DRUJ effusion with chronic ununited fracture of the ulnar styloid at the TFCC.  The distal radio-ulnar ligaments appear intact.\par \par Previous PA, lateral, oblique, PA , and 20 degree tilt lateral views of his left wrist demonstrated his distal radius fracture to be healed.  Again, there had been some interval shortening of the fracture with positive ulnar variance.  There is approximately 5 degrees of dorsal angulation on the lateral.  There is an associated ulnar styloid nonunion.\par \par PA, lateral, and PA  radiographs of the right wrist demonstrate mild positive ulnar variance.

## 2023-12-22 ENCOUNTER — NON-APPOINTMENT (OUTPATIENT)
Age: 48
End: 2023-12-22

## 2024-02-27 ENCOUNTER — EMERGENCY (EMERGENCY)
Facility: HOSPITAL | Age: 49
LOS: 1 days | Discharge: HOME | End: 2024-02-27
Attending: STUDENT IN AN ORGANIZED HEALTH CARE EDUCATION/TRAINING PROGRAM | Admitting: STUDENT IN AN ORGANIZED HEALTH CARE EDUCATION/TRAINING PROGRAM
Payer: MEDICAID

## 2024-02-27 VITALS
DIASTOLIC BLOOD PRESSURE: 98 MMHG | RESPIRATION RATE: 16 BRPM | OXYGEN SATURATION: 98 % | HEART RATE: 64 BPM | SYSTOLIC BLOOD PRESSURE: 146 MMHG | TEMPERATURE: 99 F

## 2024-02-27 DIAGNOSIS — Z98.890 OTHER SPECIFIED POSTPROCEDURAL STATES: Chronic | ICD-10-CM

## 2024-02-27 DIAGNOSIS — Z90.49 ACQUIRED ABSENCE OF OTHER SPECIFIED PARTS OF DIGESTIVE TRACT: Chronic | ICD-10-CM

## 2024-02-27 PROCEDURE — 99283 EMERGENCY DEPT VISIT LOW MDM: CPT

## 2024-02-27 NOTE — ED PROVIDER NOTE - NSFOLLOWUPINSTRUCTIONS_ED_ALL_ED_FT
1) Please follow-up with your therapist within the next 2-3 days.  Please call today for an appointment.   2) If you have any worsening of symptoms or any other concerns please return to the ED immediately.  3) Please continue taking your home medications as directed.    Please avoid using substances not prescribed by your provider and/or street drugs.  These can be dangerous and even life-threatening to your health.    Suicidal Feelings: How to Help Yourself  Suicide is when you end your own life. Suicidal ideation includes expressing thoughts about, or a preoccupation with, ending your own life. There are many things you can do to help yourself feel better when struggling with these feelings. Many services and people are available to support you and others who struggle with similar feelings.    If you ever feel like you may hurt yourself or others, or have thoughts about taking your own life, get help right away. To get help:  Go to your nearest emergency department.  Call your local emergency services (911 in the [place]  Call the [facility] and human services helpline (211 in the [place]  Call or text a suicide hotline to speak with a trained counselor. The following suicide hotlines are available in the [place]:  9-721-754-TALK (1-387.852.8462 or 262 in the [place]  3-884-ONVFTQX (1-921.672.1921).  Text 005146. This is the Crisis Text Line in the [place]  1-415.758.3038. This is a hotline for Martiniquais speakers.  1-972.198.4040. This is a hotline for TTY users.  6-176-8-U-RASHEED (1-465.172.4281). This is a hotline for lesbian, stacy, bisexual, transgender, or questioning youth.  For a list of hotlines in [place], visit suicide.org/hotlines/international/sdpcce-focqvfz-skdamezz.html  Contact a crisis center or a local suicide prevention center. To find a crisis center or suicide prevention center:  Call your local hospital, clinic, community service organization, mental health center, social service provider, or health department. Ask for help with connecting to a crisis center.  For a list of crisis centers in the [place], visit: suicidepreventionlifeline.org  For a list of crisis centers in [place], visit: suicideprevention.ca  How to help yourself feel better  A teen talking with an adult.  Promise yourself that you will not do anything bad or extreme when you have suicidal feelings. Remember the times you have felt hopeful.  Many people have gotten through suicidal thoughts and feelings, and you can too.  If you have had these feelings before, remind yourself that you can get through them again.  Let family, friends, teachers, or counselors know how you are feeling. Do not separate yourself from those who care about you and want to help you.  Talk with someone every day, even if you do not feel like talking to anyone or being with other people.  Face-to-face conversation is best to help them understand your feelings.  Contact a mental health care provider and work with this person regularly.  Make a safety plan that you can follow during a crisis.  Include phone numbers of suicide prevention hotlines, mental health professionals, and trusted friends and family members you can call during an emergency.  Save these numbers on your phone.  If you are thinking of taking a lot of medicine, give your medicine to someone who can give it to you as prescribed.  If you are on antidepressants and are concerned you will overdose, tell your health care provider so that he or she can give you safer medicines.  Try to stick to your routines and follow a schedule every day. Make self-care a priority.  Make a list of realistic goals, and cross them off when you achieve them. Accomplishments can give you a sense of worth.  Wait until you are feeling better before doing things that you find difficult or unpleasant.  Do things that you have always enjoyed to take your mind off your feelings.  Try reading a book, or listening to or playing music.  Spending time outside, in nature, may help you feel better.  Follow these instructions at home:  A sign asking the reader not to drink beer, wine, or hard liquor.   Visit your primary health care provider every year for a physical and a mental health checkup.  Take over-the-counter and prescription medicines only as told by your health care provider.  Ask your health care provider about the possible side effects of any medicines you are taking.  Ask your health care provider about whether suicidal ideation is a possible side effect of any of your medicines.  Learn about suicidal ideation and what increases the risk for the development of suicidal thoughts.  Eat a well-balanced diet, and eat regular meals.  Get plenty of rest.  Exercise if you are able. Just 30 minutes of exercise each day can help you feel better.  Keep your living space well lit.  Do not use alcohol or drugs. Remove these substances from your home.  General recommendations  Remove weapons, poisons, knives, and other deadly items from your home.  Work with a mental health care provider as needed.  When you are feeling well, write yourself a letter with tips and support that you can read when you are not feeling well.  Remember that life's difficulties can be sorted out with help. Conditions can be treated, and you can learn behaviors and ways of thinking that will help you.  Work with your health care provider or counselor to learn ways of coping with your thoughts and feelings.  Where to find more information  National Suicide Prevention Lifeline: www.suicidepreventionlifeline.org  Hopeline: www.[employer].Sefaira  American Foundation for Suicide Prevention: www.afsp.org  The Rasheed Project (for lesbian, stacy, bisexual, transgender, or questioning youth): www.thetrevorproject.org  National [place] of Mental Health: www.nimh.nih.gov/health/topics/suicide-prevention  Suicide Prevention Resources: afsp.org/suicide-prevention-resources  Contact a health care provider if:  You feel as though you are a burden to others.  You feel agitated, angry, vengeful, or have extreme mood swings.  You have withdrawn from family and friends.  You are frequently using drugs or alcohol.  Get help right away if:  You are talking about suicide or wishing to die.  You start making plans for how to commit suicide.  You feel that you have no reason to live.  You start making plans for putting your affairs in order, saying goodbye, or giving your possessions away.  You feel guilt, shame, or unbearable pain, and it seems like there is no way out.  You are engaging in risky behaviors that could lead to death.  If you have any of these thoughts or symptoms, get help right away:  Go to your nearest emergency department or crisis center.  Call emergency services (911 in the [place]  Call or text a suicide crisis helpline.  Summary  Suicide is when you take your own life. Suicidal feelings are thoughts about ending your own life.  Promise yourself that you will not do anything bad or extreme when you have suicidal feelings.  Let family, friends, teachers, or counselors know how you are feeling.  Get help right away if you start making plans for how to commit suicide.

## 2024-02-27 NOTE — ED ADULT NURSE NOTE - CHIEF COMPLAINT QUOTE
Patient brought to [facility] Emergency Department by [facility](unit 6 medic 108) from methadone clinic near [facility]. Practitioner called ED to say patient was in clinic and was dozing off and impaired. Called 911. Practitioner stated patient took PCP and heroin yesterday. Patient currently denies drug use, suicidal or homodical ideation. Patient asking "is this necessary?" Noted to get up off stretcher. Security to triage.  History of substance abuse and asthma.  at triage. A & O x 4. Practitioner:  Fiona Flores, ([phone]). Patient brought to  to get undressed . Patient states he does not have a wallet. Has a metal box and a phone.

## 2024-02-27 NOTE — ED ADULT TRIAGE NOTE - CHIEF COMPLAINT QUOTE
Patient brought to [facility] Emergency Department by [facility](unit 6 medic 108) from methadone clinic near [facility]. Practitioner called ED to say patient was in clinic and was dozing off and impaired. Called 911. Practitioner stated patient took PCP and heroin yesterday. Patient currently denies drug use, suicidal or homodical ideation.  History of substance abuse and asthma. EKG and FS at triage. A & O x 4. Patient brought to [facility] Emergency Department by [facility](unit 6 medic 108) from methadone clinic near [facility]. Practitioner called ED to say patient was in clinic and was dozing off and impaired. Called 911. Practitioner stated patient took PCP and heroin yesterday. Patient currently denies drug use, suicidal or homodical ideation.  History of substance abuse and asthma. EKG and  at triage. A & O x 4. Patient brought to [facility] Emergency Department by [facility](unit 6 medic 108) from methadone clinic near [facility]. Practitioner called ED to say patient was in clinic and was dozing off and impaired. Called 911. Practitioner stated patient took PCP and heroin yesterday. Patient currently denies drug use, suicidal or homodical ideation. Patient asking "is this necessary?"  History of substance abuse and asthma. EKG and  at triage. A & O x 4. Practitioner:  Fiona Flores, ([phone]). Patient undressed and belongings secured. Patient brought to [facility] Emergency Department by [facility](unit 6 medic 108) from methadone clinic near [facility]. Practitioner called ED to say patient was in clinic and was dozing off and impaired. Called 911. Practitioner stated patient took PCP and heroin yesterday. Patient currently denies drug use, suicidal or homodical ideation. Patient asking "is this necessary?"  History of substance abuse and asthma. EKG and  at triage. A & O x 4. Practitioner:  Fiona Flores, ([phone]). Patient undressed and belongings secured. Patient states he does not have a wallet. Secured a metal box and a phone. Patient brought to [facility] Emergency Department by [facility](unit 6 medic 108) from methadone clinic near [facility]. Practitioner called ED to say patient was in clinic and was dozing off and impaired. Called 911. Practitioner stated patient took PCP and heroin yesterday. Patient currently denies drug use, suicidal or homodical ideation. Patient asking "is this necessary?" Noted to get up off stretcher. Security to triage.  History of substance abuse and asthma.  at triage. A & O x 4. Practitioner:  Fiona Flores, ([phone]). Patient brought to  to get undressed . Patient states he does not have a wallet. Has a metal box and a phone.

## 2024-02-27 NOTE — ED ADULT NURSE NOTE - NSFALLUNIVINTERV_ED_ALL_ED
Bed/Stretcher in lowest position, wheels locked, appropriate side rails in place/Call bell, personal items and telephone in reach/Instruct patient to call for assistance before getting out of bed/chair/stretcher/Non-slip footwear applied when patient is off stretcher/[place] to call system/Physically safe environment - no spills, clutter or unnecessary equipment/Purposeful proactive rounding/Room/bathroom lighting operational, light cord in reach

## 2024-02-27 NOTE — CHART NOTE - NSCHARTNOTEFT_GEN_A_CORE
MOODY requested by [employer] to obtain collateral information from patient's Methadone Clinic. MOODY contacted patient's practitioner, Fiona Flores ([phone]). MOODY was informed that as per patient's counselor, two weeks ago patient was on his way to work and expressed SI. Later that day patient was reported to have attempted overdose using fentanyl. It was also noted that patient attempted again the next day as well. No additional SI reported since then. Mr. Flores also mentioned that patient lost his job on Friday (2/23). No additional information provided.     MOODY advised attendingUnique of the information above. MOODY advised that no plans for psych to meet with patient. MOODY requested to provide patient with metro card for dc home.     No further SW needs.

## 2024-02-27 NOTE — ED PROVIDER NOTE - PHYSICAL EXAMINATION
GEN: no acute respiratory distress. nontoxic, speaking comfortably in full sentences, ambulating with steady gait.  HEENT: NCAT. face symmetrical. PERRL 4mm, MMM, oropharynx wnl.  Neck: no JVD, trachea midline, no lymphadenopathy, FROM  CV: RRR. +S1S2, no murmur. 2+ pulses in 4 extremities, cap refill <2 sec  Chest: CTA B/l. no wheezing, rales, rhonchi. no retractions. good air movement.   ABD: +BS, soft, non distended, non tender.   : no cva or suprapubic tenderness  MSK: No clubbing, cyanosis, edema. FROM of all extremities.   Neuro: AAOX3. Sensation intact, motor 5/5 throughout. no ataxia  SKIN: No rash  psych: Calm, cooperative with exam.  No SI no HI no hallucinations.

## 2024-02-27 NOTE — ED PROVIDER NOTE - OBJECTIVE STATEMENT
48-year-old male past medical history anxiety/depression on Lexapro, opiate dependence on methadone (92 mg daily) sent from [facility] for somnolence.  Spoke with Fiona Flores, practitioner at methadone clinic.  Patient is dispensed week supply of methadone, last dose would have been finished yesterday.  Patient came in this morning and was very somnolent.  Patient reports PCP use yesterday intranasally.  Denies any injection of drugs.  Denies any marijuana, tobacco, alcohol, heroin.  Mr. Flores also reports patient had reported SI several weeks ago and not sure if patient still has SI.  Also reports patient recently having relationship issue and may have lost job several days ago.  Patient awake and alert upon my evaluation.  He denies SI, HI, hallucinations.  Does report having thoughts of suicide several weeks ago but denies symptoms.  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  Lives at home with his children.  Patient denies recent change in mood.  Patient reports seeing therapist at his methadone program, [facility].

## 2024-02-27 NOTE — ED ADULT NURSE NOTE - OBJECTIVE STATEMENT
Send in by nearby methadone clinic by NP this am for dozing off. Pt agitated on arrival, demanding to leave. Denies  SI,HI,AH,VH.

## 2024-02-27 NOTE — ED PROVIDER NOTE - PATIENT PORTAL LINK FT
You can access the FollowMyHealth Patient Portal offered by [facility] by registering at the following website: [email or web address]. By joining [facility]’s FollowMyHealth portal, you will also be able to view your health information using other applications (apps) compatible with our system.

## 2024-02-27 NOTE — ED PROVIDER NOTE - NS ED ROS FT
Constitutional: No fever. no chills.   Eyes: No visual changes, eye pain or redness  HEENT: No throat pain, hearing changes, ear pain, nasal pain. No nose bleeding.  CV: No chest pain, no palpitations  Resp: No shortness of breath, no cough  GI: No abdominal pain. No nausea. no  vomiting. No diarrhea. No constipation.   MSK: No musculoskeletal pain  Skin: No rash, lesions, no bruises  Neuro: No headache. No numbness or tingling. No weakness. No dizziness.  Allergy/Immunology: no allergy to medicine

## 2024-02-27 NOTE — ED PROVIDER NOTE - CLINICAL SUMMARY MEDICAL DECISION MAKING FREE TEXT BOX
48-year-old male sent in from methadone clinic after appearing somnolent with reported PCP use yesterday.  Patient awake alert and oriented in no evidence of clinical intoxication in ED.  Methadone practitioner also mention patient recently having SI and questionable social stressors at home.  Patient denying SI HI in the ED.  Denies hallucination.  Will obtain additional collateral via social work from patient's therapist at methadone clinic  and reassess.

## 2024-02-27 NOTE — ED PROVIDER NOTE - PROGRESS NOTE DETAILS
Collateral from  reviewed and appreciated.  Patient denies any form of suicidality or ideation or plan at this time. He also reports when he used fentanyl several weeks ago it was not intended for suicide attempt.  Patient reports currently is employed despite collateral otherwise.  Patient reports will follow-up with outpatient provider.  Reports no access to guns at home.  Patient understands if he has thoughts of suicide, change in mood he can talk to his therapist if available or call 9 1 or come directly to ED.  Strict return precautions with the patient at bedside expressed understanding.  Stable for discharge.